# Patient Record
Sex: MALE | Race: WHITE | NOT HISPANIC OR LATINO | Employment: FULL TIME | URBAN - NONMETROPOLITAN AREA
[De-identification: names, ages, dates, MRNs, and addresses within clinical notes are randomized per-mention and may not be internally consistent; named-entity substitution may affect disease eponyms.]

---

## 2024-09-15 ENCOUNTER — OFFICE VISIT (OUTPATIENT)
Dept: URGENT CARE | Facility: URGENT CARE | Age: 44
End: 2024-09-15
Payer: COMMERCIAL

## 2024-09-15 VITALS
WEIGHT: 294.31 LBS | TEMPERATURE: 97.7 F | RESPIRATION RATE: 18 BRPM | SYSTOLIC BLOOD PRESSURE: 134 MMHG | HEART RATE: 92 BPM | OXYGEN SATURATION: 98 % | DIASTOLIC BLOOD PRESSURE: 92 MMHG

## 2024-09-15 DIAGNOSIS — T16.2XXA FOREIGN BODY OF LEFT EAR, INITIAL ENCOUNTER: Primary | ICD-10-CM

## 2024-09-15 RX ORDER — OFLOXACIN 3 MG/ML
4 SOLUTION AURICULAR (OTIC) 2 TIMES DAILY
Qty: 7 ML | Refills: 0 | Status: SHIPPED | OUTPATIENT
Start: 2024-09-15 | End: 2024-09-22

## 2024-09-15 RX ORDER — OFLOXACIN 3 MG/ML
5 SOLUTION AURICULAR (OTIC) ONCE
Status: SHIPPED | OUTPATIENT
Start: 2024-09-15

## 2024-09-15 ASSESSMENT — ENCOUNTER SYMPTOMS
HEADACHES: 1
GASTROINTESTINAL NEGATIVE: 1
COUGH: 0
RHINORRHEA: 0
CARDIOVASCULAR NEGATIVE: 1
FATIGUE: 0
CHILLS: 0

## 2024-09-15 NOTE — PROGRESS NOTES
Subjective   Patient ID: Dennis Vance is a 43 y.o. male. They present today with a chief complaint of Earache ((L) ear x1 day).    History of Present Illness  43-year-old male here with complaints of left ear pain questionable foreign body he had been cleaning his ears over the last couple days and patient states it feels like something is in it    Did try and flush it yesterday at home without any improvement    Patient with past medical history positive for sleep apnea and abdominal hernia      Earache   Associated symptoms include headaches. Pertinent negatives include no coughing or rhinorrhea.       Past Medical History  Allergies as of 09/15/2024 - Reviewed 09/15/2024   Allergen Reaction Noted    Ciprofloxacin Hives 09/15/2024       (Not in a hospital admission)       No past medical history on file.    No past surgical history on file.     reports that he has been smoking cigarettes. He has never used smokeless tobacco.    Review of Systems  Review of Systems   Constitutional:  Negative for chills and fatigue.   HENT:  Positive for ear pain. Negative for congestion and rhinorrhea.    Respiratory:  Negative for cough.    Cardiovascular: Negative.    Gastrointestinal: Negative.    Genitourinary: Negative.    Neurological:  Positive for headaches.                                  Objective    Vitals:    09/15/24 1324   BP: (!) 134/92   BP Location: Left arm   Patient Position: Sitting   BP Cuff Size: Large adult long   Pulse: 92   Resp: 18   Temp: 36.5 °C (97.7 °F)   TempSrc: Oral   SpO2: 98%   Weight: 134 kg (294 lb 5 oz)     No LMP for male patient.    Physical Exam  Vitals and nursing note reviewed.   Constitutional:       Appearance: Normal appearance.   HENT:      Head: Normocephalic and atraumatic.      Right Ear: Tympanic membrane, ear canal and external ear normal.      Left Ear: Ear canal and external ear normal.      Ears:      Comments: White fibrinous type material noted to the left ear canal  obstructing the tympanic membrane       Nose: Nose normal.      Mouth/Throat:      Mouth: Mucous membranes are dry.   Cardiovascular:      Rate and Rhythm: Normal rate and regular rhythm.   Pulmonary:      Effort: Pulmonary effort is normal.      Breath sounds: Normal breath sounds.   Abdominal:      Palpations: Abdomen is soft.   Musculoskeletal:         General: Normal range of motion.      Cervical back: Normal range of motion and neck supple.   Skin:     General: Skin is warm and dry.      Capillary Refill: Capillary refill takes less than 2 seconds.   Neurological:      General: No focal deficit present.      Mental Status: He is alert.         Foreign Body Removal - Orifice    Date/Time: 9/15/2024 1:49 PM    Performed by: Sherri Gandhi PA-C  Authorized by: Sherri Gandhi PA-C    Consent:     Consent obtained:  Verbal    Consent given by:  Patient    Risks, benefits, and alternatives were discussed: yes      Risks discussed:  Bleeding    Alternatives discussed:  No treatment  Universal protocol:     Patient identity confirmed:  Verbally with patient  Location:     Location:  Ear    Ear location:  L ear  Pre-procedure details:     Imaging:  None  Sedation:     Sedation type:  None  Anesthesia:     Topical anesthetic:  None  Procedure details:     Localization method:  Direct visualization    Removal mechanism:  Curette and alligator forceps    Intact foreign body removal: no    Comments:      Was unable to remove the foreign body      Point of Care Test & Imaging Results from this visit  No results found for this visit on 09/15/24.   No results found.    Diagnostic study results (if any) were reviewed by Sherri Gandhi PA-C.    Assessment/Plan   Allergies, medications, history, and pertinent labs/EKGs/Imaging reviewed by Sherri Gandhi PA-C.     Medical Decision Making  Foreign body left ear otitis media otitis externa    Patient with white fibrinous type material noted to the left ear canal just in front of  the tympanic membrane  Attempted to remove it multiple times first with flushing then with manual manipulation but it is right against the tympanic membrane and patient is having pain and discomfort with any manipulation    Will give him information for ENT and put him on antibiotic drops        Orders and Diagnoses  There are no diagnoses linked to this encounter.    Medical Admin Record      Follow Up Instructions  No follow-ups on file.    Patient disposition: Home    Electronically signed by Sherri Gandhi PA-C  1:32 PM